# Patient Record
Sex: MALE | Race: WHITE | URBAN - METROPOLITAN AREA
[De-identification: names, ages, dates, MRNs, and addresses within clinical notes are randomized per-mention and may not be internally consistent; named-entity substitution may affect disease eponyms.]

---

## 2022-08-31 ENCOUNTER — TELEPHONE (OUTPATIENT)
Dept: INTERNAL MEDICINE CLINIC | Age: 37
End: 2022-08-31

## 2022-08-31 NOTE — TELEPHONE ENCOUNTER
Spoke with pt and scheduled him as a NP with Dr Shola Jc in Jan 2023. Pt states understanding that no other provider in our office is accepting NPs and he can't change providers after establishing.

## 2022-08-31 NOTE — TELEPHONE ENCOUNTER
----- Message from Vaniachau Zarate sent at 8/30/2022  3:59 PM EDT -----  Subject: Message to Provider    QUESTIONS  Information for Provider? PT called in his wife is a pt at this office and   he wants to know if theyll take him on as a new pt or if he can be put on   a wait list for a new pt apt   ---------------------------------------------------------------------------  --------------  [id number] [employer]  [phone]; OK to leave message on voicemail  ---------------------------------------------------------------------------  --------------  SCRIPT ANSWERS  Relationship to Patient?  Self